# Patient Record
(demographics unavailable — no encounter records)

---

## 2025-04-23 NOTE — ASSESSMENT
[FreeTextEntry1] : IMP: Fibrocystic with positive family hx. BIRADS 2 imaging 10/2024   PLAN: RTO yearly  B/L mammo/sono 10/2025

## 2025-04-23 NOTE — HISTORY OF PRESENT ILLNESS
[de-identified] : Ms. NATTY BRYANT is a 67-year-old woman here for a follow-up visit.   Hx: Bilateral BENIGN Breast Biopsies. Fibrocystic Breast Disease.  Family history of breast cancer involves her 2 sisters (age 43 and age 44), maternal grandmother in her 50's.  1 sister with uterine cancer. Denies family history of ovarian cancer.   Patient is BRCA Negative.    B/L mammo/sono 10/2024 - BIRADS 2   At this time patient denies breast pain, palpable masses and/or nipple discharge.

## 2025-04-23 NOTE — PHYSICAL EXAM
[Normal] : supple, no neck mass and thyroid not enlarged [Normal Supraclavicular Lymph Nodes] : normal supraclavicular lymph nodes [Normal Axillary Lymph Nodes] : normal axillary lymph nodes [Normal] : oriented to person, place and time, with appropriate affect [de-identified] :  fibrocystic changes but no masses

## 2025-05-01 NOTE — HISTORY OF PRESENT ILLNESS
[de-identified] : Ms. NATTY BRYANT is a 67-year-old woman here for a follow-up visit.   Hx: Bilateral BENIGN Breast Biopsies. Fibrocystic Breast Disease.  Family history of breast cancer involves her 2 sisters (age 43 and age 44), maternal grandmother in her 50's.  1 sister with uterine cancer. Denies family history of ovarian cancer.   Patient is BRCA Negative.    B/L mammo/sono 10/2024 - BIRADS 2   At this time patient denies breast pain, palpable masses and/or nipple discharge.

## 2025-05-01 NOTE — ASSESSMENT
[FreeTextEntry1] : IMP: Fibrocystic with positive family hx. BRCA negative BIRADS 2 imaging 10/2024   PLAN: RTO yearly  B/L mammo/sono 10/2025

## 2025-05-01 NOTE — HISTORY OF PRESENT ILLNESS
[de-identified] : Ms. NATTY BRYANT is a 67-year-old woman here for a follow-up visit.   Hx: Bilateral BENIGN Breast Biopsies. Fibrocystic Breast Disease.  Family history of breast cancer involves her 2 sisters (age 43 and age 44), maternal grandmother in her 50's.  1 sister with uterine cancer. Denies family history of ovarian cancer.   Patient is BRCA Negative.    B/L mammo/sono 10/2024 - BIRADS 2   At this time patient denies breast pain, palpable masses and/or nipple discharge.